# Patient Record
Sex: FEMALE | Race: BLACK OR AFRICAN AMERICAN
[De-identification: names, ages, dates, MRNs, and addresses within clinical notes are randomized per-mention and may not be internally consistent; named-entity substitution may affect disease eponyms.]

---

## 2019-04-15 ENCOUNTER — HOSPITAL ENCOUNTER (OUTPATIENT)
Dept: HOSPITAL 8 - ED | Age: 49
Setting detail: OBSERVATION
LOS: 1 days | Discharge: HOME | End: 2019-04-16
Attending: FAMILY MEDICINE | Admitting: FAMILY MEDICINE
Payer: COMMERCIAL

## 2019-04-15 VITALS — WEIGHT: 293 LBS | HEIGHT: 69 IN | BODY MASS INDEX: 43.4 KG/M2

## 2019-04-15 VITALS — SYSTOLIC BLOOD PRESSURE: 122 MMHG | DIASTOLIC BLOOD PRESSURE: 82 MMHG

## 2019-04-15 DIAGNOSIS — E11.40: ICD-10-CM

## 2019-04-15 DIAGNOSIS — E78.00: ICD-10-CM

## 2019-04-15 DIAGNOSIS — E78.5: ICD-10-CM

## 2019-04-15 DIAGNOSIS — R07.89: Primary | ICD-10-CM

## 2019-04-15 DIAGNOSIS — E66.01: ICD-10-CM

## 2019-04-15 DIAGNOSIS — I10: ICD-10-CM

## 2019-04-15 DIAGNOSIS — F41.0: ICD-10-CM

## 2019-04-15 DIAGNOSIS — M50.223: ICD-10-CM

## 2019-04-15 DIAGNOSIS — Z83.3: ICD-10-CM

## 2019-04-15 DIAGNOSIS — R42: ICD-10-CM

## 2019-04-15 LAB
% IRON SATURATION: 9 % (ref 20–55)
ALBUMIN SERPL-MCNC: 4 G/DL (ref 3.4–5)
ALP SERPL-CCNC: 103 U/L (ref 45–117)
ALT SERPL-CCNC: 23 U/L (ref 12–78)
ANION GAP SERPL CALC-SCNC: 7 MMOL/L (ref 5–15)
BASOPHILS # BLD AUTO: 0.02 X10^3/UL (ref 0–0.1)
BASOPHILS NFR BLD AUTO: 0 % (ref 0–1)
BILIRUB SERPL-MCNC: 0.5 MG/DL (ref 0.2–1)
CALCIUM SERPL-MCNC: 9.3 MG/DL (ref 8.5–10.1)
CHLORIDE SERPL-SCNC: 105 MMOL/L (ref 98–107)
CREAT SERPL-MCNC: 0.83 MG/DL (ref 0.55–1.02)
EOSINOPHIL # BLD AUTO: 0.1 X10^3/UL (ref 0–0.4)
EOSINOPHIL NFR BLD AUTO: 1 % (ref 1–7)
ERYTHROCYTE [DISTWIDTH] IN BLOOD BY AUTOMATED COUNT: 16.7 % (ref 9.6–15.2)
EST. AVERAGE GLUCOSE BLD GHB EST-MCNC: 111 MG/DL (ref 0–126)
HBA1C MFR BLD: 5.5 % (ref 4.2–6.3)
IRON LEVEL: 35 MCG/DL (ref 50–170)
LYMPHOCYTES # BLD AUTO: 2.7 X10^3/UL (ref 1–3.4)
LYMPHOCYTES NFR BLD AUTO: 27 % (ref 22–44)
MCH RBC QN AUTO: 23.5 PG (ref 27–34.8)
MCHC RBC AUTO-ENTMCNC: 31.7 G/DL (ref 32.4–35.8)
MCV RBC AUTO: 74.2 FL (ref 80–100)
MD: NO
MONOCYTES # BLD AUTO: 0.64 X10^3/UL (ref 0.2–0.8)
MONOCYTES NFR BLD AUTO: 6 % (ref 2–9)
NEUTROPHILS # BLD AUTO: 6.68 X10^3/UL (ref 1.8–6.8)
NEUTROPHILS NFR BLD AUTO: 66 % (ref 42–75)
PLATELET # BLD AUTO: 379 X10^3/UL (ref 130–400)
PMV BLD AUTO: 9.1 FL (ref 7.4–10.4)
PROT SERPL-MCNC: 8.2 G/DL (ref 6.4–8.2)
RBC # BLD AUTO: 5.86 X10^6/UL (ref 3.82–5.3)
T4 FREE SERPL-MCNC: 1.16 NG/DL (ref 0.76–1.46)
TIBC SERPL-MCNC: 378 MCG/DL (ref 250–450)
TROPONIN I SERPL-MCNC: < 0.015 NG/ML (ref 0–0.04)
TROPONIN I SERPL-MCNC: < 0.015 NG/ML (ref 0–0.04)

## 2019-04-15 PROCEDURE — 71046 X-RAY EXAM CHEST 2 VIEWS: CPT

## 2019-04-15 PROCEDURE — G0378 HOSPITAL OBSERVATION PER HR: HCPCS

## 2019-04-15 PROCEDURE — 36415 COLL VENOUS BLD VENIPUNCTURE: CPT

## 2019-04-15 PROCEDURE — 83036 HEMOGLOBIN GLYCOSYLATED A1C: CPT

## 2019-04-15 PROCEDURE — 83540 ASSAY OF IRON: CPT

## 2019-04-15 PROCEDURE — 80053 COMPREHEN METABOLIC PANEL: CPT

## 2019-04-15 PROCEDURE — A9502 TC99M TETROFOSMIN: HCPCS

## 2019-04-15 PROCEDURE — 93971 EXTREMITY STUDY: CPT

## 2019-04-15 PROCEDURE — 83550 IRON BINDING TEST: CPT

## 2019-04-15 PROCEDURE — 96374 THER/PROPH/DIAG INJ IV PUSH: CPT

## 2019-04-15 PROCEDURE — 99285 EMERGENCY DEPT VISIT HI MDM: CPT

## 2019-04-15 PROCEDURE — 93880 EXTRACRANIAL BILAT STUDY: CPT

## 2019-04-15 PROCEDURE — 96372 THER/PROPH/DIAG INJ SC/IM: CPT

## 2019-04-15 PROCEDURE — 84439 ASSAY OF FREE THYROXINE: CPT

## 2019-04-15 PROCEDURE — 78452 HT MUSCLE IMAGE SPECT MULT: CPT

## 2019-04-15 PROCEDURE — 85379 FIBRIN DEGRADATION QUANT: CPT

## 2019-04-15 PROCEDURE — 93005 ELECTROCARDIOGRAM TRACING: CPT

## 2019-04-15 PROCEDURE — 72141 MRI NECK SPINE W/O DYE: CPT

## 2019-04-15 PROCEDURE — 80061 LIPID PANEL: CPT

## 2019-04-15 PROCEDURE — C9898 INPNT STAY RADIOLABELED ITEM: HCPCS

## 2019-04-15 PROCEDURE — 84484 ASSAY OF TROPONIN QUANT: CPT

## 2019-04-15 PROCEDURE — 85025 COMPLETE CBC W/AUTO DIFF WBC: CPT

## 2019-04-15 PROCEDURE — 84443 ASSAY THYROID STIM HORMONE: CPT

## 2019-04-15 PROCEDURE — 70551 MRI BRAIN STEM W/O DYE: CPT

## 2019-04-15 PROCEDURE — 93017 CV STRESS TEST TRACING ONLY: CPT

## 2019-04-15 PROCEDURE — 82728 ASSAY OF FERRITIN: CPT

## 2019-04-15 PROCEDURE — 96376 TX/PRO/DX INJ SAME DRUG ADON: CPT

## 2019-04-15 PROCEDURE — 96375 TX/PRO/DX INJ NEW DRUG ADDON: CPT

## 2019-04-15 RX ADMIN — HEPARIN SODIUM SCH UNITS: 5000 INJECTION, SOLUTION INTRAVENOUS; SUBCUTANEOUS at 21:51

## 2019-04-15 NOTE — NUR
C/O CP AND LEFT LEG PAIN. "I TRAVEL A LOT FOR WORK. I DID A LOT OF WALKING OVER 
THE WEEKEND. I HAVE SOME CP ALSO. IT'S 4/10 RIGHT NOW. MY LEFT CALF HURTS TOO." 
NO ACUTE DISTRESS NOTED. NO C/O N/V/D, TRAUMA, SYNCOPE, SOB.  PT PLACED ON CONT 
PULSE OX,NIBP, CARDIAC MONITOR.

## 2019-04-16 VITALS — SYSTOLIC BLOOD PRESSURE: 123 MMHG | DIASTOLIC BLOOD PRESSURE: 78 MMHG

## 2019-04-16 VITALS — DIASTOLIC BLOOD PRESSURE: 68 MMHG | SYSTOLIC BLOOD PRESSURE: 109 MMHG

## 2019-04-16 VITALS — SYSTOLIC BLOOD PRESSURE: 107 MMHG | DIASTOLIC BLOOD PRESSURE: 76 MMHG

## 2019-04-16 VITALS — SYSTOLIC BLOOD PRESSURE: 119 MMHG | DIASTOLIC BLOOD PRESSURE: 81 MMHG

## 2019-04-16 LAB
ALBUMIN SERPL-MCNC: 3.5 G/DL (ref 3.4–5)
ALP SERPL-CCNC: 93 U/L (ref 45–117)
ALT SERPL-CCNC: 17 U/L (ref 12–78)
ANION GAP SERPL CALC-SCNC: 7 MMOL/L (ref 5–15)
BASOPHILS # BLD AUTO: 0.07 X10^3/UL (ref 0–0.1)
BASOPHILS NFR BLD AUTO: 1 % (ref 0–1)
BILIRUB SERPL-MCNC: 0.3 MG/DL (ref 0.2–1)
CALCIUM SERPL-MCNC: 8.8 MG/DL (ref 8.5–10.1)
CHLORIDE SERPL-SCNC: 108 MMOL/L (ref 98–107)
CHOL/HDL RATIO: 3.7
CREAT SERPL-MCNC: 0.84 MG/DL (ref 0.55–1.02)
EOSINOPHIL # BLD AUTO: 0.16 X10^3/UL (ref 0–0.4)
EOSINOPHIL NFR BLD AUTO: 2 % (ref 1–7)
ERYTHROCYTE [DISTWIDTH] IN BLOOD BY AUTOMATED COUNT: 16.8 % (ref 9.6–15.2)
HDL CHOL %: 27 % (ref 28–40)
HDL CHOLESTEROL (DIRECT): 34 MG/DL (ref 40–60)
LDL CHOLESTEROL,CALCULATED: 71 MG/DL (ref 54–169)
LDLC/HDLC SERPL: 2.1 {RATIO} (ref 0.5–3)
LYMPHOCYTES # BLD AUTO: 2.49 X10^3/UL (ref 1–3.4)
LYMPHOCYTES NFR BLD AUTO: 28 % (ref 22–44)
MCH RBC QN AUTO: 24.1 PG (ref 27–34.8)
MCHC RBC AUTO-ENTMCNC: 32.7 G/DL (ref 32.4–35.8)
MCV RBC AUTO: 73.8 FL (ref 80–100)
MD: NO
MONOCYTES # BLD AUTO: 0.79 X10^3/UL (ref 0.2–0.8)
MONOCYTES NFR BLD AUTO: 9 % (ref 2–9)
NEUTROPHILS # BLD AUTO: 5.33 X10^3/UL (ref 1.8–6.8)
NEUTROPHILS NFR BLD AUTO: 60 % (ref 42–75)
PLATELET # BLD AUTO: 330 X10^3/UL (ref 130–400)
PMV BLD AUTO: 9.3 FL (ref 7.4–10.4)
PROT SERPL-MCNC: 7 G/DL (ref 6.4–8.2)
RBC # BLD AUTO: 5.19 X10^6/UL (ref 3.82–5.3)
TRIGL SERPL-MCNC: 103 MG/DL (ref 50–200)
TROPONIN I SERPL-MCNC: < 0.015 NG/ML (ref 0–0.04)
TSH SERPL-ACNC: 0.55 MIU/L (ref 0.36–3.74)
VLDLC SERPL CALC-MCNC: 21 MG/DL (ref 0–25)

## 2019-04-16 RX ADMIN — HEPARIN SODIUM SCH UNITS: 5000 INJECTION, SOLUTION INTRAVENOUS; SUBCUTANEOUS at 05:25

## 2019-04-16 RX ADMIN — HEPARIN SODIUM SCH UNITS: 5000 INJECTION, SOLUTION INTRAVENOUS; SUBCUTANEOUS at 13:26
